# Patient Record
Sex: FEMALE | Race: WHITE
[De-identification: names, ages, dates, MRNs, and addresses within clinical notes are randomized per-mention and may not be internally consistent; named-entity substitution may affect disease eponyms.]

---

## 2019-08-25 ENCOUNTER — HOSPITAL ENCOUNTER (EMERGENCY)
Dept: HOSPITAL 61 - ER | Age: 39
Discharge: HOME | End: 2019-08-25
Payer: SELF-PAY

## 2019-08-25 VITALS — WEIGHT: 130 LBS | BODY MASS INDEX: 28.05 KG/M2 | HEIGHT: 57 IN

## 2019-08-25 VITALS — SYSTOLIC BLOOD PRESSURE: 124 MMHG | DIASTOLIC BLOOD PRESSURE: 83 MMHG

## 2019-08-25 DIAGNOSIS — F17.200: ICD-10-CM

## 2019-08-25 DIAGNOSIS — K04.7: Primary | ICD-10-CM

## 2019-08-25 DIAGNOSIS — L03.211: ICD-10-CM

## 2019-08-25 PROCEDURE — 99283 EMERGENCY DEPT VISIT LOW MDM: CPT

## 2019-08-25 NOTE — PHYS DOC
Past Medical History


Past Medical History:  No Pertinent History


Past Surgical History:  No Surgical History


Alcohol Use:  None


Drug Use:  None





Adult General


Chief Complaint


Chief Complaint:  DENTAL PROBLEM





HPI


HPI





Patient is a 39  year old female who presents with complaining of tooth abscess.

Patient complaining of pain in left upper jaw and tooth for the last 2 days  

with edema of gum and states she thinks she has abscess. Patient denies recent 

injury, fever and chills, dysphagia. Patient states she has had problem with her

teeth with missing multiple teeth. Patient currently is smoking.





Review of Systems


Review of Systems





Constitutional: Denies fever or chills []


Eyes: Denies change in visual acuity, redness, or eye pain []


HENT: Denies nasal congestion or sore throat []


Respiratory: Denies cough or shortness of breath []


Cardiovascular: No additional information not addressed in HPI []


GI: Denies abdominal pain, nausea, vomiting, bloody stools or diarrhea []


: Denies dysuria or hematuria []


Musculoskeletal: Denies back pain or joint pain []


Integument: Denies rash or skin lesions []


Neurologic: Denies headache, focal weakness or sensory changes []


Endocrine: Denies polyuria or polydipsia []





All other systems were reviewed and found to be within normal limits, except as 

documented in this note.





Allergies


Allergies





Allergies








Coded Allergies Type Severity Reaction Last Updated Verified


 


  No Known Drug Allergies    8/25/19 No











Physical Exam


Physical Exam








Constitutional: Well developed, well nourished, mild distress, non-toxic 

appearance. []


HENT: Normocephalic, atraumatic, poor dental hygiene, edema and tenderness of 

gum  in area of tooth #12 and 13 dental cavity and tenderness in the teeth.


Eyes: PERRLA, EOMI, conjunctiva normal, no discharge. [] 


Neck: Normal range of motion, no tenderness, supple, no stridor. [] 


Cardiovascular:Heart rate regular rhythm, no murmur []


Lungs & Thorax:  Bilateral breath sounds clear to auscultation []


Neurologic: Alert and oriented X 3, no focal deficits noted. []


Psychologic: Affect normal, judgement normal, mood normal. []





Current Patient Data


Vital Signs





                                   Vital Signs








  Date Time  Temp Pulse Resp B/P (MAP) Pulse Ox O2 Delivery O2 Flow Rate FiO2


 


8/25/19 06:29 99.0 80 18 124/83 (97) 98 Room Air  





 99.0       











EKG


EKG


[]





Radiology/Procedures


Radiology/Procedures


[]





Course & Med Decision Making


Course & Med Decision Making


Evaluation of patient in ER showed 39-year-old female patient presented to ER 

with complaining of dental pain and abscess without fever and chills and facial 

cellulitis. Patient did not want to have pain medication in ER. Patient was 

advised to quit smoking Follow-up with the dentist.





Dragon Disclaimer


Dragon Disclaimer


This electronic medical record was generated, in whole or in part, using a voice

 recognition dictation system.





Departure


Departure


Impression:  


   Primary Impression:  


   Dental abscess


Disposition:  01 HOME, SELF-CARE (@0638)


Condition:  STABLE


Patient Instructions:  Dental Abscess





Additional Instructions:  


Quit smoking


Follow-up with your dentist in 3-5 days


Return to ER if not getting better


Scripts


Acetaminophen With Codeine (TYLENOL WITH CODEINE #3 TABLET) 1 Each Tablet


1 TAB PO PRN Q6HRS PRN for PAIN, #10 TAB


   Prov: CARY MORALES MD         8/25/19 


Penicillin V Potassium (PENICILLIN V POTASSIUM) 500 Mg Tablet


2 TAB PO Q12HR, #40 TAB


   Prov: CARY MORALES MD         8/25/19 


Naproxen (NAPROSYN) 500 Mg Tablet


1 TAB PO BID for pain, #20 TAB


   Prov: CARY MORALES MD         8/25/19











CARY MORALES MD             Aug 25, 2019 06:41

## 2019-09-28 ENCOUNTER — HOSPITAL ENCOUNTER (EMERGENCY)
Dept: HOSPITAL 61 - ER | Age: 39
Discharge: HOME | End: 2019-09-28
Payer: SELF-PAY

## 2019-09-28 VITALS — DIASTOLIC BLOOD PRESSURE: 67 MMHG | SYSTOLIC BLOOD PRESSURE: 120 MMHG

## 2019-09-28 VITALS — HEIGHT: 57 IN | BODY MASS INDEX: 28.05 KG/M2 | WEIGHT: 130 LBS

## 2019-09-28 DIAGNOSIS — F17.210: ICD-10-CM

## 2019-09-28 DIAGNOSIS — J45.21: Primary | ICD-10-CM

## 2019-09-28 PROCEDURE — 99283 EMERGENCY DEPT VISIT LOW MDM: CPT

## 2019-09-28 PROCEDURE — 94640 AIRWAY INHALATION TREATMENT: CPT

## 2019-09-28 NOTE — PHYS DOC
Past Medical History


Past Medical History:  Asthma


Past Surgical History:  No Surgical History


Smoking:  Cigarettes


Alcohol Use:  None


Drug Use:  None





Adult General


Chief Complaint


Chief Complaint:  ASTHMA





HPI


HPI





Patient is a 39  year old female who presents with complaining of asthma. 

Patient complaining of dry cough and shortness of breath since this morning with

bilateral chest wall pain during episodes of cough. Patient states she is out of

asthma medication including inhalers and nebulizer medication. Patient did not 

have sick contact. Patient states she did not smoke today because of shortness 

of breath. Patient denies fever, pregnancy, vomiting and diarrhea.





Review of Systems


Review of Systems





Constitutional: Denies fever or chills []


Eyes: Denies change in visual acuity, redness, or eye pain []


HENT: Denies nasal congestion or sore throat []


Respiratory: Reports cough and shortness of breath


Cardiovascular: No additional information not addressed in HPI []


GI: Denies abdominal pain, nausea, vomiting, bloody stools or diarrhea []


: Denies dysuria or hematuria []


Musculoskeletal: Denies back pain or joint pain []


Integument: Denies rash or skin lesions []


Neurologic: Denies headache, focal weakness or sensory changes []


Endocrine: Denies polyuria or polydipsia []





All other systems were reviewed and found to be within normal limits, except as 

documented in this note.





Current Medications


Current Medications





Current Medications








 Medications


  (Trade)  Dose


 Ordered  Sig/[facility]  Start Time


 Stop Time Status Last Admin


Dose Admin


 


 Acetaminophen/


 Hydrocodone Bitart


  (Lortab 5/325)  1 tab  1X  ONCE  9/28/19 19:15


 9/28/19 19:16 DC 9/28/19 19:19


1 TAB


 


 Albuterol/


 Ipratropium


  (Duoneb)  3 ml  1X  ONCE  9/28/19 19:15


 9/28/19 19:16 DC 9/28/19 19:36


3 ML


 


 Prednisone


  (Prednisone)  60 mg  1X  ONCE  9/28/19 19:15


 9/28/19 19:16 DC 9/28/19 19:18


60 MG











Allergies


Allergies





Allergies








Coded Allergies Type Severity Reaction Last Updated Verified


 


  No Known Drug Allergies    8/25/19 No











Physical Exam


Physical Exam





Constitutional: Well developed, well nourished, mild distress, non-toxic 

appearance. []


HENT: Normocephalic, atraumatic, bilateral external ears normal, oropharynx 

moist, no oral exudates, nose normal. []


Eyes: PERRLA, EOMI, conjunctiva normal, no discharge. [] 


Neck: Normal range of motion, no tenderness, supple, no stridor. [] 


Cardiovascular:Heart rate regular rhythm, no murmur []


Lungs & Thorax: No respiratory distress, bilateral wheezing and coarse breath 

sounds


Abdomen: Bowel sounds normal, soft, no tenderness, no masses, no pulsatile 

masses. [] 


Skin: Warm, dry, no erythema, no rash. [] 


Back: No tenderness, no CVA tenderness. [] 


Extremities: No tenderness, no cyanosis, no clubbing, ROM intact, no edema. [] 


Neurologic: Alert and oriented X 3, normal motor function, normal sensory 

function, no focal deficits noted. []


Psychologic: Affect normal, judgement normal, mood normal. []





Current Patient Data


Vital Signs





                                   Vital Signs








  Date Time  Temp Pulse Resp B/P (MAP) Pulse Ox O2 Delivery O2 Flow Rate FiO2


 


9/28/19 19:46  72 18 120/67 (84) 99 Room Air  


 


9/28/19 18:38 98.4       





 98.4       











EKG


EKG


[]





Radiology/Procedures


Radiology/Procedures


[]





Course & Med Decision Making


Course & Med Decision Making


Evaluation of patient in ER showed 39-year-old female patient with history of 

asthma and smoking presented with acute asthma episode without having asthma 

medication at home. Patient treated with nebulizer treatment and Norco and 

prednisone PO in ER(patient didn't want to have  injection of prednisone). 

Patient was advised to quit smoking.


I've spoken with the patient and/or caregivers. I've explained the patient's 

condition, diagnosis and treatment plan based on information available to me at 

this time. I've answered the patient's and/or caregivers questions and addressed

 any concerns. The patient and/or caregivers have a good understanding the 

patient's diagnosis, condition and treatment plan as can be expected at this 

point. Vital signs have been stabilized. The patient's condition is stable for 

discharge from the emergency department.





The patient will pursue further outpatient evaluation with her primary care 

provider or other designated consulting physician as outlined in the discharge 

instructions. Patient and/or caregivers are agreeable to this plan of care and 

follow-up instructions have been explained in detail. The patient and/or 

caregivers have received these instructions in written format and expressed 

understanding of these discharge instructions. The patient and her caregivers 

are aware that if any significant change in condition or worsening of symptoms 

should prompt him to immediately return to this of the closest emergency 

department.  If an emergent department is not readily available I would 

encourage him to call 911.





Nirmala Disclaimer


Dragon Disclaimer


This electronic medical record was generated, in whole or in part, using a voice

 recognition dictation system.





Departure


Departure


Impression:  


   Primary Impression:  


   Acute asthma exacerbation


Disposition:  01 HOME, SELF-CARE (at 1930)


Condition:  IMPROVED


Referrals:  


NO PCP (PCP)


Patient Instructions:  Asthma Attacks, Prevention, Asthma, Acute Bronchospasm, 

Smoking Cessation, Tips For Success





Additional Instructions:  


Drink plenty of liquids


Follow-up with your primary care physician in 3-5 days


Return to ER if not getting better


Scripts


Prednisone (PREDNISONE) 50 Mg Tablet


1 TAB PO DAILY, #5 TAB


   Prov: CARY MORALES MD         9/28/19 


Hydrocodone/Apap 5-325 (NORCO 5-325 TABLET) 1 Each Tablet


1 TAB PO PRN Q6HRS PRN for PAIN, #10 TAB 0 Refills


   Prov: CARY MORALES MD         9/28/19 


Albuterol Sulfate (ALBUTEROL SULFATE NEB SOLN) 2.5 Mg/3 Ml Vial.neb


1 VIAL NEB Q6HRS PRN for SHORTNESS OF BREATH, #25 VIAL


   Prov: CARY MORALES MD         9/28/19





Problem Qualifiers








   Primary Impression:  


   Acute asthma exacerbation


   Asthma severity:  mild  Asthma persistence:  intermittent  Qualified Codes:  

   J45.21 - Mild intermittent asthma with (acute) exacerbation








CARY MORALES MD             Sep 28, 2019 19:27

## 2021-06-02 ENCOUNTER — HOSPITAL ENCOUNTER (EMERGENCY)
Dept: HOSPITAL 61 - ER | Age: 41
Discharge: HOME | End: 2021-06-02
Payer: SELF-PAY

## 2021-06-02 VITALS — HEIGHT: 57 IN | BODY MASS INDEX: 23.78 KG/M2 | WEIGHT: 110.23 LBS

## 2021-06-02 VITALS — DIASTOLIC BLOOD PRESSURE: 71 MMHG | SYSTOLIC BLOOD PRESSURE: 107 MMHG

## 2021-06-02 DIAGNOSIS — N39.0: Primary | ICD-10-CM

## 2021-06-02 DIAGNOSIS — F17.200: ICD-10-CM

## 2021-06-02 DIAGNOSIS — J45.909: ICD-10-CM

## 2021-06-02 LAB
APTT PPP: YELLOW S
BACTERIA #/AREA URNS HPF: (no result) /HPF
BILIRUB UR QL STRIP: NEGATIVE
FIBRINOGEN PPP-MCNC: CLEAR MG/DL
NITRITE UR QL STRIP: NEGATIVE
PH UR STRIP: 7 [PH]
PROT UR STRIP-MCNC: NEGATIVE MG/DL
RBC #/AREA URNS HPF: 0 /HPF (ref 0–2)
UROBILINOGEN UR-MCNC: 0.2 MG/DL
WBC #/AREA URNS HPF: (no result) /HPF (ref 0–4)

## 2021-06-02 PROCEDURE — 99283 EMERGENCY DEPT VISIT LOW MDM: CPT

## 2021-06-02 PROCEDURE — 81001 URINALYSIS AUTO W/SCOPE: CPT

## 2021-06-02 PROCEDURE — 81025 URINE PREGNANCY TEST: CPT

## 2021-06-02 PROCEDURE — 87086 URINE CULTURE/COLONY COUNT: CPT

## 2021-06-02 NOTE — ED.ADGEN
Past Medical History


Past Medical History:  Anxiety, Asthma


Past Surgical History:  Tubal ligation


Smoking Status:  Current Every Day Smoker


Alcohol Use:  None


Drug Use:  None





General Adult


EDM:


Chief Complaint:  FLANK PAIN





HPI:


HPI:





Patient is a 41-year-old female who arrives ambulatory to the emergency 

department complaining of a 2-day history of left flank pain.  Patient states 

this pain has been waxing and waning during this time and has an aching 

character to it.  The patient states she is of the opinion she may have a kidney

infection once antibiotics so she can go home.  She denies any fevers or 

dysuria.  She further denies abdominal pain.  Additionally she denies any 

vaginal symptoms.  She is awake, alert and nontoxic-appearing.





Review of Systems:


Review of Systems:


Constitutional:   Denies fever or chills. []


Eyes:   Denies change in visual acuity. []


HENT:   Denies nasal congestion or sore throat. [] 


Respiratory:   Denies cough or shortness of breath. [] 


Cardiovascular:   Denies chest pain or edema. [] 


GI:   Denies abdominal pain, nausea, vomiting, bloody stools or diarrhea. [] 


: Reports left flank pain.  Denies dysuria. [] 


Musculoskeletal:   Denies back pain or joint pain. [] 


Integument:   Denies rash. [] 


Neurologic:   Denies headache, focal weakness or sensory changes. [] 


Endocrine:   Denies polyuria or polydipsia. [] 


Lymphatic:  Denies swollen glands. [] 


Psychiatric:  Denies depression or anxiety. []





Allergies:


Allergies:





Allergies








Coded Allergies Type Severity Reaction Last Updated Verified


 


  No Known Drug Allergies    8/25/19 No











Physical Exam:


PE:





Constitutional: Uncomfortable appearing.  Well developed, well nourished, non-

toxic appearance. []


HENT: Normocephalic, atraumatic, bilateral external ears normal, oropharynx 

moist, no oral exudates, nose normal. []


Eyes: PERRLA, EOMI, conjunctiva normal, no discharge. [] 


Neck: Normal range of motion, no tenderness, supple, no stridor. [] 


Cardiovascular:Heart rate regular rhythm, no murmur []


Lungs & Thorax:  Bilateral breath sounds clear to auscultation []


Abdomen: Bowel sounds normal, soft, no tenderness, no masses, no pulsatile 

masses. [] 


Skin: Warm, dry, no erythema, no rash. [] 


Back: Minimal CVA tenderness on the left side.  No midline pain or right-sided 

CVA tenderness.  [] 


Extremities: No tenderness, no cyanosis, no clubbing, ROM intact, no edema. [] 


Neurologic: Alert and oriented X 3, normal motor function, normal sensory 

function, no focal deficits noted. []


Psychologic: Affect normal, judgement normal, mood normal. []





Current Patient Data:


Labs:





                                Laboratory Tests








Test


 6/2/21


15:18 6/2/21


15:24


 


Urine Collection Type Unknown   


 


Urine Color Yellow   


 


Urine Clarity Clear   


 


Urine pH


 7.0 (<5.0-8.0)


 





 


Urine Specific Gravity


 <=1.005


(1.000-1.030) 





 


Urine Protein


 Negative mg/dL


(NEG-TRACE) 





 


Urine Glucose (UA)


 Negative mg/dL


(NEG) 





 


Urine Ketones (Stick)


 Negative mg/dL


(NEG) 





 


Urine Blood


 Negative (NEG)


 





 


Urine Nitrite


 Negative (NEG)


 





 


Urine Bilirubin


 Negative (NEG)


 





 


Urine Urobilinogen Dipstick


 0.2 mg/dL (0.2


mg/dL) 





 


Urine Leukocyte Esterase


 Negative (NEG)


 





 


Urine RBC 0 /HPF (0-2)   


 


Urine WBC


 1-4 /HPF (0-4)


 





 


Urine Squamous Epithelial


Cells Mod /LPF  


 





 


Urine Bacteria


 Many /HPF


(0-FEW) 





 


POC Urine HCG, Qualitative


 


 Hcg negative


(Negative)








Vital Signs:





                                   Vital Signs








  Date Time  Temp Pulse Resp B/P (MAP) Pulse Ox O2 Delivery O2 Flow Rate FiO2


 


6/2/21 15:43 98.7 98 12 107/71 (83) 94 Room Air  





 98.7       











EKG:


EKG:


[]





Heart Score:


C/O Chest Pain:  No


Risk Factors:


Risk Factors:  DM, Current or recent (<one month) smoker, HTN, HLP, family 

history of CAD, obesity.


Risk Scores:


Score 0 - 3:  2.5% MACE over next 6 weeks - Discharge Home


Score 4 - 6:  20.3% MACE over next 6 weeks - Admit for Clinical Observation


Score 7 - 10:  72.7% MACE over next 6 weeks - Early Invasive Strategies





Radiology/Procedures:


Radiology/Procedures:


[]





Course & Med Decision Making:


Course & Med Decision Making


Pertinent Labs and Imaging studies reviewed. (See chart for details)





The patient remains awake, alert and in no acute distress.  Patient has findings

 that may equate to early pyelonephritis.  The patient has been drinking lots of

 fluids without relief.  I have advised the patient to have CT imaging the 

patient states she wants to try antibiotics first before any imaging can take 

place.  Should the patient develop any abdominal pain or fevers and invited her 

to return immediately to the emergency department.  Patient states she will do 

this as needed.  She is nontoxic-appearing and stable for discharge.  []





Dragon Disclaimer:


Dragon Disclaimer:


This electronic medical record was generated, in whole or in part, using a voice

 recognition dictation system.





Departure


Departure


Impression:  


   Primary Impression:  


   UTI (urinary tract infection)


   Additional Impression:  


   Left flank pain


Disposition:  01 HOME / SELF CARE / HOMELESS


Condition:  STABLE


Referrals:  


NO PCP (PCP)


Patient Instructions:  Flank Pain, Urinary Tract Infection


Scripts


Cephalexin (KEFLEX) 750 Mg Capsule


500 MG PO QID for 7 Days, #28 CAP


   Prov: SEVERIANO GALVAN DO         6/2/21





Problem Qualifiers











SEVERIANO GALVAN DO                Jun 2, 2021 15:55